# Patient Record
Sex: FEMALE
[De-identification: names, ages, dates, MRNs, and addresses within clinical notes are randomized per-mention and may not be internally consistent; named-entity substitution may affect disease eponyms.]

---

## 2020-09-17 ENCOUNTER — APPOINTMENT (OUTPATIENT)
Dept: ORTHOPEDIC SURGERY | Facility: CLINIC | Age: 70
End: 2020-09-17

## 2020-09-17 PROBLEM — Z00.00 ENCOUNTER FOR PREVENTIVE HEALTH EXAMINATION: Status: ACTIVE | Noted: 2020-09-17

## 2020-09-22 ENCOUNTER — APPOINTMENT (OUTPATIENT)
Dept: ORTHOPEDIC SURGERY | Facility: CLINIC | Age: 70
End: 2020-09-22
Payer: MEDICARE

## 2020-09-22 VITALS — WEIGHT: 244 LBS | HEIGHT: 61 IN | BODY MASS INDEX: 46.07 KG/M2

## 2020-09-22 DIAGNOSIS — M16.0 BILATERAL PRIMARY OSTEOARTHRITIS OF HIP: ICD-10-CM

## 2020-09-22 DIAGNOSIS — M17.0 BILATERAL PRIMARY OSTEOARTHRITIS OF KNEE: ICD-10-CM

## 2020-09-22 PROCEDURE — 99204 OFFICE O/P NEW MOD 45 MIN: CPT

## 2020-09-22 PROCEDURE — 73562 X-RAY EXAM OF KNEE 3: CPT | Mod: 50

## 2020-09-22 RX ORDER — AMLODIPINE BESYLATE 10 MG/1
10 TABLET ORAL
Qty: 90 | Refills: 0 | Status: ACTIVE | COMMUNITY
Start: 2020-03-22

## 2020-09-22 NOTE — HISTORY OF PRESENT ILLNESS
[de-identified] : Patient is here status post 11 years left knee replacement 10 years but right knee replacement she is complaining of pain in her right groin radiating pains the right knee. She is admitted with a cane she is also noted that the right foot tends to turn out when ambulating.

## 2020-09-22 NOTE — REASON FOR VISIT
[Initial Visit] : an initial visit for [FreeTextEntry2] : LUCRECIA KNEE PAIN - RIGHT KNEE IS MORE PAINFUL THAN RIGHT - SENT FOR NEW XRAYS

## 2020-09-22 NOTE — DISCUSSION/SUMMARY
[de-identified] : I mentioned to the patient today  that it is extremely important that she reduce her weight currently she is at a BMI of 46. I believe that she will require hip replacement sometime in the future due to the advanced findings. Currently will place her on Celebrex 20 mg p.o. b.i.d. for 6 day course and be taken there after as needed. Patient will follow up as needed.

## 2020-09-22 NOTE — PHYSICAL EXAM
[de-identified] : Right knee on exam range of motion is 0-125° her some mild warmth about the knee but it is equal to the left. Range of motion is 0-100°. There is no instability to AP stress at 90 or varus valgus stress both at 90 and full extension. No effusion noted no redness noted.\par \par Right hip exam patient has minimal internal and external rotation of right hip and it causes discomfort. [de-identified] : AP and lateral of both hips were obtained showing severe arthritis with complete loss of cartilage in the right hip\par \par AP standing and lateral and sunrise views of both knees were obtained showing bilateral knee replacements no evidence of subsidence loosening wear or other modalities.

## 2021-08-19 ENCOUNTER — RX RENEWAL (OUTPATIENT)
Age: 71
End: 2021-08-19

## 2021-08-19 RX ORDER — MELOXICAM 7.5 MG/1
7.5 TABLET ORAL TWICE DAILY
Qty: 60 | Refills: 0 | Status: ACTIVE | COMMUNITY
Start: 2020-09-22 | End: 1900-01-01